# Patient Record
Sex: MALE | Race: WHITE
[De-identification: names, ages, dates, MRNs, and addresses within clinical notes are randomized per-mention and may not be internally consistent; named-entity substitution may affect disease eponyms.]

---

## 2018-02-15 ENCOUNTER — HOSPITAL ENCOUNTER (OUTPATIENT)
Dept: HOSPITAL 19 - COL.RAD | Age: 68
End: 2018-02-15
Attending: ORTHOPAEDIC SURGERY
Payer: MEDICARE

## 2018-02-15 DIAGNOSIS — M25.511: Primary | ICD-10-CM

## 2019-04-09 ENCOUNTER — HOSPITAL ENCOUNTER (INPATIENT)
Dept: HOSPITAL 19 - MEDICAL | Age: 69
Discharge: ANOTHER HOSPITAL | DRG: 286 | End: 2019-04-09
Attending: FAMILY MEDICINE | Admitting: FAMILY MEDICINE
Payer: MEDICARE

## 2019-04-09 VITALS — OXYGEN SATURATION: 100 %

## 2019-04-09 VITALS — SYSTOLIC BLOOD PRESSURE: 99 MMHG | OXYGEN SATURATION: 100 % | DIASTOLIC BLOOD PRESSURE: 66 MMHG

## 2019-04-09 VITALS — OXYGEN SATURATION: 97 %

## 2019-04-09 VITALS — OXYGEN SATURATION: 98 %

## 2019-04-09 VITALS — WEIGHT: 277.78 LBS | HEIGHT: 70.98 IN | BODY MASS INDEX: 38.89 KG/M2

## 2019-04-09 VITALS
HEART RATE: 64 BPM | SYSTOLIC BLOOD PRESSURE: 114 MMHG | DIASTOLIC BLOOD PRESSURE: 71 MMHG | TEMPERATURE: 97.8 F | OXYGEN SATURATION: 98 %

## 2019-04-09 VITALS — TEMPERATURE: 98.2 F | SYSTOLIC BLOOD PRESSURE: 118 MMHG | DIASTOLIC BLOOD PRESSURE: 76 MMHG | HEART RATE: 62 BPM

## 2019-04-09 VITALS — HEART RATE: 50 BPM | DIASTOLIC BLOOD PRESSURE: 62 MMHG | SYSTOLIC BLOOD PRESSURE: 90 MMHG | OXYGEN SATURATION: 100 %

## 2019-04-09 VITALS — OXYGEN SATURATION: 95 %

## 2019-04-09 VITALS — TEMPERATURE: 96.5 F | OXYGEN SATURATION: 100 %

## 2019-04-09 VITALS — OXYGEN SATURATION: 99 %

## 2019-04-09 VITALS — DIASTOLIC BLOOD PRESSURE: 69 MMHG | OXYGEN SATURATION: 97 % | SYSTOLIC BLOOD PRESSURE: 124 MMHG | HEART RATE: 57 BPM

## 2019-04-09 VITALS — OXYGEN SATURATION: 94 %

## 2019-04-09 VITALS — DIASTOLIC BLOOD PRESSURE: 72 MMHG | HEART RATE: 53 BPM | SYSTOLIC BLOOD PRESSURE: 107 MMHG

## 2019-04-09 VITALS — OXYGEN SATURATION: 97 % | HEART RATE: 64 BPM | DIASTOLIC BLOOD PRESSURE: 92 MMHG | SYSTOLIC BLOOD PRESSURE: 157 MMHG

## 2019-04-09 VITALS
OXYGEN SATURATION: 100 % | TEMPERATURE: 98 F | DIASTOLIC BLOOD PRESSURE: 64 MMHG | SYSTOLIC BLOOD PRESSURE: 87 MMHG | HEART RATE: 53 BPM

## 2019-04-09 VITALS — HEART RATE: 52 BPM | SYSTOLIC BLOOD PRESSURE: 116 MMHG | DIASTOLIC BLOOD PRESSURE: 69 MMHG | OXYGEN SATURATION: 100 %

## 2019-04-09 VITALS — DIASTOLIC BLOOD PRESSURE: 72 MMHG | HEART RATE: 62 BPM | SYSTOLIC BLOOD PRESSURE: 126 MMHG | TEMPERATURE: 97.8 F

## 2019-04-09 VITALS — DIASTOLIC BLOOD PRESSURE: 91 MMHG | SYSTOLIC BLOOD PRESSURE: 204 MMHG

## 2019-04-09 VITALS — OXYGEN SATURATION: 96 %

## 2019-04-09 VITALS — HEART RATE: 68 BPM | SYSTOLIC BLOOD PRESSURE: 120 MMHG | OXYGEN SATURATION: 98 % | DIASTOLIC BLOOD PRESSURE: 74 MMHG

## 2019-04-09 VITALS — SYSTOLIC BLOOD PRESSURE: 110 MMHG | HEART RATE: 64 BPM | OXYGEN SATURATION: 98 % | DIASTOLIC BLOOD PRESSURE: 70 MMHG

## 2019-04-09 VITALS — OXYGEN SATURATION: 100 % | DIASTOLIC BLOOD PRESSURE: 41 MMHG | SYSTOLIC BLOOD PRESSURE: 53 MMHG

## 2019-04-09 VITALS — OXYGEN SATURATION: 95 % | DIASTOLIC BLOOD PRESSURE: 65 MMHG | SYSTOLIC BLOOD PRESSURE: 82 MMHG

## 2019-04-09 VITALS — HEART RATE: 54 BPM | DIASTOLIC BLOOD PRESSURE: 69 MMHG | SYSTOLIC BLOOD PRESSURE: 98 MMHG

## 2019-04-09 VITALS — SYSTOLIC BLOOD PRESSURE: 80 MMHG | OXYGEN SATURATION: 100 % | DIASTOLIC BLOOD PRESSURE: 54 MMHG

## 2019-04-09 VITALS — OXYGEN SATURATION: 99 % | SYSTOLIC BLOOD PRESSURE: 53 MMHG | DIASTOLIC BLOOD PRESSURE: 34 MMHG

## 2019-04-09 VITALS — OXYGEN SATURATION: 87 %

## 2019-04-09 VITALS — SYSTOLIC BLOOD PRESSURE: 96 MMHG | DIASTOLIC BLOOD PRESSURE: 64 MMHG

## 2019-04-09 VITALS — DIASTOLIC BLOOD PRESSURE: 80 MMHG | SYSTOLIC BLOOD PRESSURE: 153 MMHG

## 2019-04-09 VITALS — OXYGEN SATURATION: 92 %

## 2019-04-09 VITALS — OXYGEN SATURATION: 90 %

## 2019-04-09 DIAGNOSIS — I95.9: ICD-10-CM

## 2019-04-09 DIAGNOSIS — M75.101: ICD-10-CM

## 2019-04-09 DIAGNOSIS — I48.92: ICD-10-CM

## 2019-04-09 DIAGNOSIS — I44.0: ICD-10-CM

## 2019-04-09 DIAGNOSIS — M19.91: ICD-10-CM

## 2019-04-09 DIAGNOSIS — I27.20: ICD-10-CM

## 2019-04-09 DIAGNOSIS — E11.65: ICD-10-CM

## 2019-04-09 DIAGNOSIS — I97.711: Primary | ICD-10-CM

## 2019-04-09 DIAGNOSIS — E87.2: ICD-10-CM

## 2019-04-09 DIAGNOSIS — Z79.84: ICD-10-CM

## 2019-04-09 DIAGNOSIS — R00.1: ICD-10-CM

## 2019-04-09 DIAGNOSIS — J81.0: ICD-10-CM

## 2019-04-09 DIAGNOSIS — E78.5: ICD-10-CM

## 2019-04-09 DIAGNOSIS — E87.5: ICD-10-CM

## 2019-04-09 DIAGNOSIS — I44.7: ICD-10-CM

## 2019-04-09 DIAGNOSIS — E66.01: ICD-10-CM

## 2019-04-09 DIAGNOSIS — Z79.01: ICD-10-CM

## 2019-04-09 DIAGNOSIS — Z87.891: ICD-10-CM

## 2019-04-09 DIAGNOSIS — I47.1: ICD-10-CM

## 2019-04-09 LAB
ALBUMIN SERPL-MCNC: 3.5 GM/DL (ref 3.5–5)
ALP SERPL-CCNC: 78 U/L (ref 50–136)
ALT SERPL-CCNC: 62 U/L (ref 21–72)
AMYLASE SERPL-CCNC: 49 U/L (ref 30–110)
ANION GAP SERPL CALC-SCNC: 10 MMOL/L (ref 7–16)
ANION GAP SERPL CALC-SCNC: 10 MMOL/L (ref 7–16)
APTT PPP: 33.6 SECONDS (ref 26–37)
AST SERPL-CCNC: 86 U/L (ref 15–37)
BASE EXCESS BLDA CALC-SCNC: -6.2 MMOL/L (ref -2–2)
BASE EXCESS BLDA CALC-SCNC: -8.1 MMOL/L (ref -2–2)
BILIRUB DIRECT SERPL-MCNC: 0.2 MG/DL (ref 0–0.4)
BILIRUB INDIRECT SERPL-MCNC: 0.3 MG/DL (ref 0–1.1)
BILIRUB SERPL-MCNC: 0.5 MG/DL (ref 0–1)
BUN SERPL-MCNC: 23 MG/DL (ref 9–20)
BUN SERPL-MCNC: 28 MG/DL (ref 9–20)
CALCIUM SERPL-MCNC: 8 MG/DL (ref 8.4–10.2)
CALCIUM SERPL-MCNC: 8.2 MG/DL (ref 8.4–10.2)
CHLORIDE SERPL-SCNC: 100 MMOL/L (ref 98–107)
CHLORIDE SERPL-SCNC: 95 MMOL/L (ref 98–107)
CK SERPL-CCNC: 63 U/L (ref 55–170)
CO2 BLDA-SCNC: 23.1 MMOL/L
CO2 BLDA-SCNC: 25.4 MMOL/L
CO2 SERPL-SCNC: 24 MMOL/L (ref 22–30)
CO2 SERPL-SCNC: 25 MMOL/L (ref 22–30)
CREAT SERPL-SCNC: 1 UMOL/L (ref 0.66–1.25)
CREAT SERPL-SCNC: 1.09 UMOL/L (ref 0.66–1.25)
CRP SERPL-MCNC: 0.7 MG/DL (ref 0–0.9)
EOSINOPHIL NFR BLD: 1 % (ref 0–4)
ERYTHROCYTE [DISTWIDTH] IN BLOOD BY AUTOMATED COUNT: 13.3 % (ref 11.5–14.5)
FIBRINOGEN PPP-MCNC: 268 MG/DL (ref 200–450)
GLUCOSE SERPL-MCNC: 320 MG/DL (ref 74–106)
GLUCOSE SERPL-MCNC: 373 MG/DL (ref 74–106)
HCO3 BLDA-SCNC: 21.6 MEQ/L (ref 22–26)
HCO3 BLDA-SCNC: 23.1 MEQ/L (ref 22–26)
HCT VFR BLD AUTO: 45 % (ref 42–52)
HGB BLD-MCNC: 15.2 G/DL (ref 13.5–18)
INHALED O2 CONCENTRATION: 100 %
INHALED O2 CONCENTRATION: 100 %
INR BLD: 1 (ref 0.8–3)
LIPASE SERPL-CCNC: 127 U/L (ref 23–300)
LYMPHOCYTES NFR BLD MANUAL: 19 % (ref 20–51)
MAGNESIUM SERPL-MCNC: 1.7 MG/DL (ref 1.6–2.3)
MAGNESIUM SERPL-MCNC: 2 MG/DL (ref 1.6–2.3)
MCH RBC QN AUTO: 33 PG (ref 27–31)
MCHC RBC AUTO-ENTMCNC: 34 G/DL (ref 33–37)
MCV RBC AUTO: 98 FL (ref 80–100)
METAMYELOCYTES NFR BLD MANUAL: 1 % (ref 0–0)
MONOCYTES NFR BLD: 1 % (ref 1.7–9.3)
NEUTS BAND NFR BLD: 16 % (ref 0–10)
NEUTS SEG NFR BLD MANUAL: 62 % (ref 42–75.2)
PCO2 BLDA: 51 MMHG (ref 35–45)
PCO2 BLDA: 74 MMHG (ref 35–45)
PHOSPHATE SERPL-MCNC: 6.5 MG/DL (ref 2.5–4.5)
PLATELET # BLD AUTO: 238 K/MM3 (ref 130–400)
PLATELET BLD QL SMEAR: NORMAL
PMV BLD AUTO: 10.5 FL (ref 7.4–10.4)
PO2 BLDA: 287.5 MMHG (ref 80–100)
PO2 BLDA: 374.3 MMHG (ref 80–100)
POTASSIUM SERPL-SCNC: 4.4 MMOL/L (ref 3.4–5)
POTASSIUM SERPL-SCNC: 6.3 MMOL/L (ref 3.4–5)
POTASSIUM SERPL-SCNC: 6.9 MMOL/L (ref 3.4–5)
PROT SERPL-MCNC: 5.9 GM/DL (ref 6.4–8.2)
PROTHROMBIN TIME: 11.2 SECONDS (ref 9.7–12.8)
RBC # BLD AUTO: 4.58 M/MM3 (ref 4.2–5.6)
SAO2 % BLDA: 99 % (ref 92–100)
SAO2 % BLDA: 99.4 % (ref 92–100)
SODIUM SERPL-SCNC: 130 MMOL/L (ref 137–145)
SODIUM SERPL-SCNC: 134 MMOL/L (ref 137–145)
TROPONIN I SERPL-MCNC: 0.01 NG/ML (ref 0–0.04)
TROPONIN I SERPL-MCNC: 0.19 NG/ML (ref 0–0.04)
TSH SERPL DL<=0.005 MIU/L-ACNC: 4.87 UIU/ML (ref 0.47–4.68)

## 2019-04-09 PROCEDURE — C1894 INTRO/SHEATH, NON-LASER: HCPCS

## 2019-04-09 PROCEDURE — 5A1935Z RESPIRATORY VENTILATION, LESS THAN 24 CONSECUTIVE HOURS: ICD-10-PCS | Performed by: FAMILY MEDICINE

## 2019-04-09 PROCEDURE — C1751 CATH, INF, PER/CENT/MIDLINE: HCPCS

## 2019-04-09 PROCEDURE — A4216 STERILE WATER/SALINE, 10 ML: HCPCS

## 2019-04-09 PROCEDURE — 4A023N8 MEASUREMENT OF CARDIAC SAMPLING AND PRESSURE, BILATERAL, PERCUTANEOUS APPROACH: ICD-10-PCS | Performed by: INTERNAL MEDICINE

## 2019-04-09 PROCEDURE — B2161ZZ FLUOROSCOPY OF RIGHT AND LEFT HEART USING LOW OSMOLAR CONTRAST: ICD-10-PCS | Performed by: INTERNAL MEDICINE

## 2019-04-09 PROCEDURE — C1760 CLOSURE DEV, VASC: HCPCS

## 2019-04-09 PROCEDURE — B2111ZZ FLUOROSCOPY OF MULTIPLE CORONARY ARTERIES USING LOW OSMOLAR CONTRAST: ICD-10-PCS | Performed by: INTERNAL MEDICINE

## 2019-04-09 NOTE — NUR
SEDATION DECREASED BY 1/2 AT THIS TIME PER VERBAL ORDER FROM DR. NORMAN.
PROPOFOL DECREASED TO 20 MCG/KG/MIN AND FENTANYL DECREASED TO 50 MCG/HR

## 2019-04-09 NOTE — NUR
2015: Bi Welch arrived from transport of patient. Assistant with transfer.
 
*2225: Patient left room for transport to [facility], accompanied by [facility],
passing off care of patient at this time.

## 2019-04-09 NOTE — NUR
Patient assessment completed and charted at this time, please see
documentation for details. Patient on ventilator at this time, family has been
coming in and out of the room. Boris Diane Supervisor notified patient is
awaiting transport to [facility]. Will continue to monitor and assess.

## 2019-04-09 NOTE — NUR
Patient arrives to ICU intubated and sedated with EMS. Dr. Hernandez called for
intensivist consult. Dr. Diggs at the bedside. Orders received.

## 2019-04-09 NOTE — NUR
WE RETURN FROM CATH LAB AND CT AT THIS TIME. CAN IS ATTACHED TO MONITORS,
SR/SB ON TELE. HYPOTENSIVE ON MONITOR. 74/26. PRESSORS INCREASED.

## 2019-04-09 NOTE — NUR
PT RECEIVED STATUS POST CODE BLUE AT SURGICAL CENTER.  PT PLACED ON GENERIC
VENT SETTINGS.  OETT INITIALLY RECIEVED AT 27CM AT LIP.  OETT PULLED BACK
AFTER CXR DONE.  LARGE AMOUNTS OF FROTHY PINK SECRETIONS.  PICC LINE PLACED,
THEN ARTERIAL LINE BEING PLACED.  UNABLE TO GET AN ABG WITHOUT ONE AT THIS
TIME DUE TO POOR BLOOD PRESSURES.  INITIAL ABG RESULTS GIVEN TO DR. NORMAN AT
BEDSIDE.  PLEASE SEE ADDITIONAL CHARTING FOR CHANGES MADE AFTER ABG.